# Patient Record
(demographics unavailable — no encounter records)

---

## 2021-01-05 NOTE — ED.PDOC
History of Present Illness





- General


Chief Complaint: Abdominal Pain


Stated Complaint: Abdominal pain


Time Seen by Provider: 01/05/21 21:38


Information Source: patient


Exam Limitations: no limitations





- History of Present Illness


Initial Comments: 





CONSISTENT LOWER ABD/PELVIC PAIN FOR SOME TIME, ACUTELY WORSENED OVER PAST 2 D. 




HE VOIDS SMALL AMOUNTS Q20 MIN.  SLIGHT RELIEF AFTER VOIDS.  


CONSTANT PELVIC ACHE 8/10. WORSE SUPINE.  





H/O BPH (NOV 2019 COULDN'T VOID, FOUND BPH), SEES [facility] DR. LAMBERT AND TAKES MEDS 

(FLOMAX AND 1 OTHER).  [facility] YESTERDAY STARTED ABX FOR POSSIBLE UTI.  


HAD BM THIS AM (NO CONSTIPATION).  


FATHER HAD AN ABDOMINAL TUMOR IN HIS 70'S, SO PT IS CONCERNED IT COULD BE MORE 

THAN JUST BLADDER DISTENTION FROM BPH.  HAS NOT HAD CT YET PAST YEAR THAT HE CAN

RECALL.  





H/O HTN, TOOK HIS DAILY BP MEDS. 


Abdominal Pain Onset Location: suprapubic


Pain Radiation: no radiation


Quality: severe, steady


Timing/Duration: constant, getting worse


Associated Symptoms: denies symptoms





Review of Systems





- Review of Systems


Constitutional: Denies: chills, fever


EENTM: States: no symptoms reported


Respiratory: States: no symptoms reported


Cardiology: States: no symptoms reported


Gastrointestinal/Abdominal: Denies: constipation, diarrhea, nausea, vomiting


Genitourinary: States: frequency, other - UR RETENTION, UNABLE TO EMPTY BLADDER.

.  Denies: dysuria


Neurological: States: no symptoms reported


Endocrine: States: no symptoms reported


Hematologic/Lymphatic: States: no symptoms reported


All other Systems: Reviewed and Negative





Past Medical History (General)





- Patient Medical History


Hx Seizures: No


Hx Stroke: No


Hx Dementia: No


Hx Asthma: No


Hx of COPD: No


Hx Cardiac Disorders: Yes - Artery blockage.


Hx Congestive Heart Failure: No


Hx Pacemaker: No


Hx Hypertension: Yes


Hx Thyroid Disease: No


Hx Diabetes: No


Hx Gastroesophageal Reflux: No


Hx Renal Disease: No


Hx Cancer: No


Hx of HIV: No


Hx Hepatitis C: No


Hx MRSA: No


Surgical History: other





- Vaccination History


Hx Tetanus, Diphtheria Vaccination: No


Hx Influenza Vaccination: Yes


Hx Pneumococcal Vaccination: Yes





- Social History


Hx Tobacco Use: No


Hx Chewing Tobacco Use: No


Hx Alcohol Use: Yes


Hx Substance Use: No


Hx Substance Use Treatment: No


Hx Depression: No


Feels Threatened In Home Enviroment: No


Feels Threatened In a Relationship: No


Hx Physical Abuse: No


Hx Emotional Abuse: No


Hx Suspected Abuse: No





- Female History


Patient is a Female of Child Bearing Age (10 -59 yrs old): No





- Triage Comment


ED Triage Comment: The patient walked from the ER waiting room into ER bed 5 and

was alert and oriented times 4. He complained of lower left and right abdominal 

pain but denied nausea and diarrhea. He denied chest pain and shortness of 

breath and had no other noted complaints at the time of contact.





Family Medical History





- Family History


  ** Father


Family History: No Known





Physical Exam





- Physical Exam


General Appearance: Alert, Obvious distress


Eyes, Ears, Nose, Throat Exam: PERRL/EOMI, normal ENT inspection


Neck: full range of motion, normal inspection


Respiratory: lungs clear, normal breath sounds


Cardiovascular/Chest: regular rate, rhythm, no murmur


Peripheral Pulses: No deficit


Gastrointestinal/Abdominal: normal bowel sounds, no organomegaly, no pulsatile 

mass, tenderness - SUPRAPUBIC


Rectal Exam: deferred


Back Exam: no CVA tenderness, no vertebral tenderness


Extremity: normal range of motion, normal inspection


Neurologic: no motor/sensory deficits, alert, normal mood/affect, oriented x 3


Skin Exam: normal color, warm/dry


Lymphatic: no adenopathy





Progress





- Progress


Progress: 





01/06/21 01:11


CT = BLADDER DISTENDED, MILD BL HYDONEPHROSIS FROM SEVERE BPH. 


TAKES FLOMAX AND ONE OTHER BPH MED (LIKELY PROSCAR). 


CMP NONCONTRIBUTORY. 


CBC NEUTROPHILIC LEUKOCYTOSIS.  UA NEG FOR IFXN BUT IS ON ABX FROM [facility] FOR 

EMPIRIC TX FOR UTI.  UTI MIGHT HAVE BEEN SOURCE OF ELEV WBC, AS PT IS OTHERWISE 

ASX.  





I D/W PT THE RESULTS - HIS PAIN IS RETURNING.  GIVING REPEAT DILAUDID AND 

STRAIGHT CATHETERIZING X 1.  THEN DC TO HOME WITH F/U WITH UROLOGY, AS 

SCHEDULED.  








Departure





- Departure


Clinical Impression: 


 BPH with obstruction/lower urinary tract symptoms, Urinary retention due to 

benign prostatic hyperplasia, Neutrophilic leukocytosis, Hydronephrosis due to 

obstruction of bladder





Disposition: Discharge to Home or Self Care


Condition: Good


Departure Forms:  ED Discharge - Pt. Copy, Patient Portal Self Enrollment


Instructions:  DI for Abdominal Pain-Adult, Benign Prostatic Hyperplasia 

(Enlarged Prostate) (DC)


Diet: resume usual diet


Activity: increase activity as tolerated


Referrals: 


Tristin Pride MD [Primary Care Provider] - 1-2 Weeks


Additional Instructions: 


Please finish taking the antibiotics which your urologist prescribed. 


Continue frequent voidings.

## 2021-01-06 NOTE — CT
EXAM DESCRIPTION:  Abdomen/Pelvis w/Contrast



CLINICAL HISTORY:  68 years  Male  PELVIC PAIN, URINARY

RETENTION, H/O BPH.  



COMPARISON:  None.



TECHNIQUE:  Contiguous axial images obtained through the abdomen

and pelvis following IV contrast. Reformatted images obtained.  



This exam was performed according to our department optimization

program which includes automated exposure control, adjustment of

the mA and/or kv according to patient size and/or use of

iterative reconstruction technique.



FINDINGS:



There is a 0.5 cm nodular lesion in the lateral right lower lung

on image 9/98.



Minimal pericardial effusion.



The liver appears unremarkable.

The spleen and pancreas appear unremarkable.

No adrenal masses.



There is mild bilateral hydronephrosis without ureteral calculi

visualized. There is a tiny nonobstructing right renal calculus.

There are small left renal cyst. There is a probable right

parapelvic renal cyst.



The gallbladder is visualized.



No aneurysmal dilatation of the aorta.



No bowel obstruction.   The appendix appears unremarkable.   

Occasional colonic diverticula.



The prostate gland is enlarged. The urinary bladder is markedly

distended. No free fluid is visualized.   



Small fat-containing inguinal hernias. Tiny fat-containing

umbilical hernia. There is some stranding in the subcutaneous

fatty tissues in the intra-abdominal fatty tissues at the level

of the umbilical hernia.



Degenerative changes in the spine.



IMPRESSION:



The prostate gland is enlarged with marked distention of the

urinary bladder. The findings suggest bladder outlet obstruction.

There is also mild bilateral hydronephrosis likely related to the

bladder outlet obstruction.



There is some stranding in the subcutaneous fatty tissues and in

the intra-abdominal fatty tissues around the area of the

umbilicus suggesting inflammatory change. Clinical correlation

recommended.



There is a tiny nonobstructing right renal calculus.



5 mm right solid right lower lobe pulmonary nodule. No routine

follow-up imaging is recommended.

These guidelines do not apply to immunocompromised patients and

patients with cancer. Follow up in patients with significant

comorbidities as clinically warranted. For lung cancer screening,

adhere to Lung-RADS guidelines. Reference: Radiology. 2017;

284(1):228-43.



Electronically signed by:  Pan Batres MD  1/6/2021 12:23 AM CST

Workstation: [id number]